# Patient Record
Sex: MALE | Race: WHITE | NOT HISPANIC OR LATINO | Employment: OTHER | URBAN - METROPOLITAN AREA
[De-identification: names, ages, dates, MRNs, and addresses within clinical notes are randomized per-mention and may not be internally consistent; named-entity substitution may affect disease eponyms.]

---

## 2017-03-09 ENCOUNTER — HOSPITAL ENCOUNTER (OUTPATIENT)
Dept: RADIOLOGY | Facility: MEDICAL CENTER | Age: 62
End: 2017-03-09
Attending: PHYSICIAN ASSISTANT
Payer: MEDICAID

## 2017-03-09 DIAGNOSIS — B18.1 HEPATITIS B, CHRONIC (HCC): ICD-10-CM

## 2017-03-09 DIAGNOSIS — K40.90 INGUINAL HERNIA WITHOUT OBSTRUCTION OR GANGRENE, RECURRENCE NOT SPECIFIED, UNSPECIFIED LATERALITY: ICD-10-CM

## 2017-03-09 DIAGNOSIS — B19.10 HEPATITIS B VIRUS INFECTION, UNSPECIFIED CHRONICITY: ICD-10-CM

## 2017-03-09 DIAGNOSIS — R18.8 OTHER ASCITES: ICD-10-CM

## 2017-03-09 PROCEDURE — 700111 HCHG RX REV CODE 636 W/ 250 OVERRIDE (IP): Performed by: RADIOLOGY

## 2017-03-09 PROCEDURE — P9047 ALBUMIN (HUMAN), 25%, 50ML: HCPCS | Performed by: RADIOLOGY

## 2017-03-09 PROCEDURE — 49083 ABD PARACENTESIS W/IMAGING: CPT

## 2017-03-09 PROCEDURE — C1729 CATH, DRAINAGE: HCPCS

## 2017-03-09 RX ORDER — ALBUMIN (HUMAN) 12.5 G/50ML
50 SOLUTION INTRAVENOUS ONCE
Status: COMPLETED | OUTPATIENT
Start: 2017-03-09 | End: 2017-03-09

## 2017-03-09 RX ADMIN — ALBUMIN (HUMAN) 50 G: 12.5 SOLUTION INTRAVENOUS at 10:45

## 2017-03-09 NOTE — PROGRESS NOTES
US guided therapeutic paracentesis done by Dr. Garcia; RLQ access site; 8000mL cloudy yellow fluid obtained and discarded; pt tolerated the procedure well; pt hemodynamically stable pre/intra/post procedure; 62.5g IV albumin was given to the patient per the Albumin Infusion Protocol, please see MAR; patient did have increased swelling at insertion site for paracentesis, patient was monitored for 15 min with no increased swelling and MD was made aware; all questions and concerns answered prior to d/c; patient provided with all appropriate education for procedure; pt d/c home.

## 2017-03-13 ENCOUNTER — HOSPITAL ENCOUNTER (OUTPATIENT)
Dept: RADIOLOGY | Facility: MEDICAL CENTER | Age: 62
End: 2017-03-13
Attending: PHYSICIAN ASSISTANT
Payer: MEDICAID

## 2017-03-13 DIAGNOSIS — B19.11 HEPATITIS B INFECTION WITHOUT DELTA AGENT WITH HEPATIC COMA, UNSPECIFIED CHRONICITY: ICD-10-CM

## 2017-03-13 DIAGNOSIS — R18.8 PERITONEAL EFFUSION (CHRONIC): ICD-10-CM

## 2017-03-13 DIAGNOSIS — K40.90 INGUINAL HERNIA OF LEFT SIDE WITHOUT OBSTRUCTION OR GANGRENE: ICD-10-CM

## 2017-03-13 DIAGNOSIS — K40.90 BUBONOCELE: ICD-10-CM

## 2017-03-13 DIAGNOSIS — B18.1 HEPATITIS B CARRIER (HCC): ICD-10-CM

## 2017-03-13 PROCEDURE — 76857 US EXAM PELVIC LIMITED: CPT

## 2017-04-07 ENCOUNTER — OFFICE VISIT (OUTPATIENT)
Dept: CARDIOLOGY | Facility: MEDICAL CENTER | Age: 62
End: 2017-04-07
Payer: MEDICAID

## 2017-04-07 VITALS
HEIGHT: 74 IN | DIASTOLIC BLOOD PRESSURE: 70 MMHG | BODY MASS INDEX: 26.56 KG/M2 | HEART RATE: 62 BPM | WEIGHT: 207 LBS | OXYGEN SATURATION: 99 % | SYSTOLIC BLOOD PRESSURE: 120 MMHG

## 2017-04-07 DIAGNOSIS — I25.10 CORONARY ARTERY DISEASE INVOLVING NATIVE CORONARY ARTERY OF NATIVE HEART WITHOUT ANGINA PECTORIS: ICD-10-CM

## 2017-04-07 PROCEDURE — 99203 OFFICE O/P NEW LOW 30 MIN: CPT | Performed by: INTERNAL MEDICINE

## 2017-04-07 RX ORDER — FUROSEMIDE 20 MG/1
20 TABLET ORAL DAILY
COMMUNITY
End: 2017-10-23

## 2017-04-07 RX ORDER — CIPROFLOXACIN 500 MG/1
500 TABLET, FILM COATED ORAL DAILY
COMMUNITY
End: 2017-10-23

## 2017-04-07 RX ORDER — SPIRONOLACTONE 50 MG/1
50 TABLET, FILM COATED ORAL DAILY
COMMUNITY
End: 2017-10-23

## 2017-04-07 ASSESSMENT — ENCOUNTER SYMPTOMS
CHILLS: 0
FEVER: 0
SHORTNESS OF BREATH: 0
DEPRESSION: 0
PND: 0
BRUISES/BLEEDS EASILY: 0
HEARTBURN: 0
HEADACHES: 0
PALPITATIONS: 0
COUGH: 0
NERVOUS/ANXIOUS: 0
EYE DISCHARGE: 0
BLURRED VISION: 0
DIZZINESS: 0
NAUSEA: 0
MYALGIAS: 0

## 2017-04-07 NOTE — MR AVS SNAPSHOT
"        Noah Valdez   2017 3:45 PM   Office Visit   MRN: [id number]    Department:  Heart [facility]   Dept Phone:  [phone]    Description:  Male : 1955   Provider:  Danis Rodriguez M.D.           Reason for Visit     New Patient           Allergies as of 2017     No Known Allergies      You were diagnosed with     Coronary artery disease involving native coronary artery of native heart without angina pectoris   [[id number]]         Vital Signs     Blood Pressure Pulse Height Weight Body Mass Index Oxygen Saturation    120/70 mmHg 62 1.88 m (6' 2.02\") 93.895 kg (207 lb) 26.57 kg/m2 99%    Smoking Status                   Former Smoker           Basic Information     Date Of Birth Sex Race Ethnicity Preferred Language    1955 Male White Non- English      Problem List              ICD-10-CM Priority Class Noted - Resolved    Lactic acidosis E87.2 Medium  2016 - Present    Hyponatremia E87.1 Medium  2016 - Present    Acute hepatitis B17.9 High  2016 - Present    Cirrhosis (CMS-HCC) K74.60 High  2016 - Present    Coagulopathy (CMS-HCC) D68.9 Medium  2016 - Present    Tobacco dependence (Chronic) F17.200 Low  2016 - Present    Acute hepatitis B B16.9   2016 - Present    Acute kidney injury (CMS-HCC) N17.9   10/1/2016 - Present    Coffee ground emesis K92.0   10/1/2016 - Present    Hepatic encephalopathy syndrome (CMS-HCC) K72.90   10/1/2016 - Present    Severe protein-calorie malnutrition (CMS-HCC) E43   10/1/2016 - Present    Hypotension I95.9   10/1/2016 - Present      Health Maintenance     Patient has no pending health maintenance at this time      Current Immunizations     No immunizations on file.      Below and/or attached are the medications your provider expects you to take. Review all of your home medications and newly ordered medications with your provider and/or pharmacist. Follow medication instructions as directed by your " provider and/or pharmacist. Please keep your medication list with you and share with your provider. Update the information when medications are discontinued, doses are changed, or new medications (including over-the-counter products) are added; and carry medication information at all times in the event of emergency situations     Allergies:  No Known Allergies          Medications  Valid as of: April 07, 2017 -  4:33 PM    Generic Name Brand Name Tablet Size Instructions for use    Ciprofloxacin HCl (Tab) CIPRO 500 MG Take 500 mg by mouth every day.        Entecavir (Tab) BARACLUDE 0.5 MG Take 1 Tab by mouth every day.        Furosemide (Tab) LASIX 20 MG Take 20 mg by mouth every day.        Lactulose (Solution) lactulose 20 GM/30ML Take 30 mL by mouth 3 times a day.        Morphine Sulfate (Solution) morphine (pf) 4 mg/ml 4 MG/ML 0.5 mL by Intravenous route every four hours as needed (Severe Pain; (Pain scale 7-10)).        Multiple Vitamin   Take  by mouth.        Multiple Vitamins-Minerals (Tab) THERAGRAN-M  Take 1 Tab by mouth every day.        Omeprazole (CAPSULE DELAYED RELEASE) PRILOSEC 20 MG Take 1 Cap by mouth 2 Times a Day.        Ondansetron (TABLET DISPERSIBLE) ZOFRAN ODT 4 MG Take 1 Tab by mouth every four hours as needed for Nausea/Vomiting (give PO if no IV route available).        Ondansetron HCl (Solution) ZOFRAN 4 MG/2ML 2 mL by Intravenous route every four hours as needed for Nausea/Vomiting (Do not exceed 16mg of ondansetron in a 24 hour period).        OxyCODONE HCl (Tab) ROXICODONE 5 MG Take 1 Tab by mouth every four hours as needed (Moderate Pain; (Pain scale 4-6)).        RifAXIMin (Tab) XIFAXAN 550 MG Take 1 Tab by mouth 2 Times a Day.        Spironolactone (Tab) ALDACTONE 50 MG Take 50 mg by mouth every day.        .                 Medicines prescribed today were sent to:     [facility] #[id number] - [place], [place] - [address]    [address] [place] [address]    Phone:  [phone] Fax: [phone]    Open 24 Hours?: No      Medication refill instructions:       If your prescription bottle indicates you have medication refills left, it is not necessary to call your provider’s office. Please contact your pharmacy and they will refill your medication.    If your prescription bottle indicates you do not have any refills left, you may request refills at any time through one of the following ways: The online [employer] system (except Urgent Care), by calling your provider’s office, or by asking your pharmacy to contact your provider’s office with a refill request. Medication refills are processed only during regular business hours and may not be available until the next business day. Your provider may request additional information or to have a follow-up visit with you prior to refilling your medication.   *Please Note: Medication refills are assigned a new Rx number when refilled electronically. Your pharmacy may indicate that no refills were authorized even though a new prescription for the same medication is available at the pharmacy. Please request the medicine by name with the pharmacy before contacting your provider for a refill.           [employer] Access Code: Activation code not generated  Current [employer] Status: Active

## 2017-04-07 NOTE — PROGRESS NOTES
Subjective:   Noah Valdez is a 61 y.o. male who presents today in consultation at the request of  and Dr. Lemus for follow-up of CABG.  The patient's extensive records from [facility] been reviewed in detail  Basically he was transferred there with liver failure in October 2016. He also developed renal failure and was on dialysis for a while.  Stress echo demonstrated normal left ventricular systolic function at rest as well as hyperdynamic left ventricular systolic function with dobutamine although he developed hypotension with dobutamine as well as some ST segment depression.  He underwent coronary artery artery demonstrating normal left ventricular systolic function. The left main coronary artery was totally occluded. There was stenosis of the proximal LAD and circumflex. There was extensive collateralization of both vessels from the right coronary artery which had only 40% narrowing.  The patient has had a remarkable recovery over the last several months and even frequent paracentesis has lessened with time as well as Lasix and spironolactone.  Energy level has improved significantly.  He has no symptoms of coronary artery disease.  He has an inguinal hernia.  Gastroenterologist locally with like to do upper endoscopy and colonoscopy but is reluctant due to the patient's coronary artery disease.  The general surgeon would rather not operate on this patient for inguinal hernia due to his extensive asymptomatic coronary artery disease.  The patient's inguinal hernias minimally symptomatic at this time.    Past Medical History   Diagnosis Date   • Renal disorder      passed on their own     Past Surgical History   Procedure Laterality Date   • Tonsillectomy       History reviewed. No pertinent family history.  History   Smoking status   • Former Smoker   • Quit date: 09/07/2016   Smokeless tobacco   • Never Used     No Known Allergies  Outpatient Encounter Prescriptions  as of 4/7/2017   Medication Sig Dispense Refill   • ciprofloxacin (CIPRO) 500 MG Tab Take 500 mg by mouth every day.     • furosemide (LASIX) 20 MG Tab Take 20 mg by mouth every day.     • spironolactone (ALDACTONE) 50 MG Tab Take 50 mg by mouth every day.     • Multiple Vitamin (MULTI VITAMIN DAILY PO) Take  by mouth.     • entecavir (BARACLUDE) 0.5 MG Tab Take 1 Tab by mouth every day. 30 Tab    • lactulose 20 GM/30ML Solution Take 30 mL by mouth 3 times a day. (Patient not taking: Reported on 4/7/2017) 30 Each    • omeprazole (PRILOSEC) 20 MG delayed-release capsule Take 1 Cap by mouth 2 Times a Day. (Patient not taking: Reported on 4/7/2017) 30 Cap 11   • ondansetron (ZOFRAN ODT) 4 MG TABLET DISPERSIBLE Take 1 Tab by mouth every four hours as needed for Nausea/Vomiting (give PO if no IV route available). (Patient not taking: Reported on 4/7/2017) 10 Tab 0   • ondansetron (ZOFRAN) 4 MG/2ML Solution injection 2 mL by Intravenous route every four hours as needed for Nausea/Vomiting (Do not exceed 16mg of ondansetron in a 24 hour period). (Patient not taking: Reported on 4/7/2017) 15 mL    • oxycodone immediate-release (ROXICODONE) 5 MG Tab Take 1 Tab by mouth every four hours as needed (Moderate Pain; (Pain scale 4-6)). (Patient not taking: Reported on 4/7/2017) 30 Tab 0   • rifaximin (XIFAXAN) 550 MG Tab tablet Take 1 Tab by mouth 2 Times a Day. (Patient not taking: Reported on 4/7/2017) 60 Tab    • Morphine Sulfate (MORPHINE, PF, 4 MG/ML) 4 MG/ML Solution 0.5 mL by Intravenous route every four hours as needed (Severe Pain; (Pain scale 7-10)). (Patient not taking: Reported on 4/7/2017)     • therapeutic multivitamin-minerals (THERAGRAN-M) Tab Take 1 Tab by mouth every day.       No facility-administered encounter medications on file as of 4/7/2017.     Review of Systems   Constitutional: Negative for fever, chills and malaise/fatigue.   Eyes: Negative for blurred vision and discharge.   Respiratory: Negative for  "cough and shortness of breath.    Cardiovascular: Negative for chest pain, palpitations, leg swelling and PND.   Gastrointestinal: Negative for heartburn and nausea.   Genitourinary: Negative for dysuria and urgency.   Musculoskeletal: Negative for myalgias.   Skin: Negative for itching and rash.   Neurological: Negative for dizziness and headaches.   Endo/Heme/Allergies: Negative for environmental allergies. Does not bruise/bleed easily.   Psychiatric/Behavioral: Negative for depression. The patient is not nervous/anxious.         Objective:   /70 mmHg  Pulse 62  Ht 1.88 m (6' 2.02\")  Wt 93.895 kg (207 lb)  BMI 26.57 kg/m2  SpO2 99%    Physical Exam   Constitutional: He is oriented to person, place, and time. He appears well-developed and well-nourished.   HENT:   Head: Normocephalic and atraumatic.   Eyes: Conjunctivae and EOM are normal. No scleral icterus.   Neck: Neck supple. No JVD present. No thyromegaly present.   Cardiovascular: Normal rate, regular rhythm and normal heart sounds.  Exam reveals no gallop and no friction rub.    No murmur heard.  Pulmonary/Chest: Effort normal and breath sounds normal. No respiratory distress. He has no wheezes. He has no rales. He exhibits no tenderness.   Abdominal: Soft. Bowel sounds are normal. He exhibits no distension and no mass. There is no tenderness.   Neurological: He is alert and oriented to person, place, and time. Coordination normal.   Skin: Skin is warm and dry. No rash noted. No pallor.   Psychiatric: He has a normal mood and affect. His behavior is normal. Judgment and thought content normal.       Assessment:     1. Coronary artery disease involving native coronary artery of native heart without angina pectoris         Medical Decision Making:  Today's Assessment / Status / Plan:   I certainly agree with indication for elective cardiac artery bypass graft surgery in this gentleman who has had a remarkable recovery from renal and liver failure in " October.     apparently the cardiothoracic surgery group at [facility] is willing to do a CABG.  I've recommended he have his CABG done there because the possibility of liver and renal dysfunction after the surgery.  I'll be glad to seem postop.  Thank you for this consultation

## 2017-04-07 NOTE — Clinical Note
[facility]   [address]   [address]  [place] [address]  Phone: [phone]  Fax: [phone]              Noah Valdez  1955    Encounter Date: 4/7/2017    Danis Rodriguez M.D.          PROGRESS NOTE:  Subjective:   Noah Valdez is a 61 y.o. male who presents today in consultation at the request of  and Dr. Lemus for follow-up of CABG.  The patient's extensive records from [facility] been reviewed in detail  Basically he was transferred there with liver failure in October 2016. He also developed renal failure and was on dialysis for a while.  Stress echo demonstrated normal left ventricular systolic function at rest as well as hyperdynamic left ventricular systolic function with dobutamine although he developed hypotension with dobutamine as well as some ST segment depression.  He underwent coronary artery artery demonstrating normal left ventricular systolic function. The left main coronary artery was totally occluded. There was stenosis of the proximal LAD and circumflex. There was extensive collateralization of both vessels from the right coronary artery which had only 40% narrowing.  The patient has had a remarkable recovery over the last several months and even frequent paracentesis has lessened with time as well as Lasix and spironolactone.  Energy level has improved significantly.  He has no symptoms of coronary artery disease.  He has an inguinal hernia.  Gastroenterologist locally with like to do upper endoscopy and colonoscopy but is reluctant due to the patient's coronary artery disease.  The general surgeon would rather not operate on this patient for inguinal hernia due to his extensive asymptomatic coronary artery disease.  The patient's inguinal hernias minimally symptomatic at this time.    Past Medical History   Diagnosis Date   • Renal disorder      passed on  their own     Past Surgical History   Procedure Laterality Date   • Tonsillectomy       History reviewed. No pertinent family history.  History   Smoking status   • Former Smoker   • Quit date: 09/07/2016   Smokeless tobacco   • Never Used     No Known Allergies  Outpatient Encounter Prescriptions as of 4/7/2017   Medication Sig Dispense Refill   • ciprofloxacin (CIPRO) 500 MG Tab Take 500 mg by mouth every day.     • furosemide (LASIX) 20 MG Tab Take 20 mg by mouth every day.     • spironolactone (ALDACTONE) 50 MG Tab Take 50 mg by mouth every day.     • Multiple Vitamin (MULTI VITAMIN DAILY PO) Take  by mouth.     • entecavir (BARACLUDE) 0.5 MG Tab Take 1 Tab by mouth every day. 30 Tab    • lactulose 20 GM/30ML Solution Take 30 mL by mouth 3 times a day. (Patient not taking: Reported on 4/7/2017) 30 Each    • omeprazole (PRILOSEC) 20 MG delayed-release capsule Take 1 Cap by mouth 2 Times a Day. (Patient not taking: Reported on 4/7/2017) 30 Cap 11   • ondansetron (ZOFRAN ODT) 4 MG TABLET DISPERSIBLE Take 1 Tab by mouth every four hours as needed for Nausea/Vomiting (give PO if no IV route available). (Patient not taking: Reported on 4/7/2017) 10 Tab 0   • ondansetron (ZOFRAN) 4 MG/2ML Solution injection 2 mL by Intravenous route every four hours as needed for Nausea/Vomiting (Do not exceed 16mg of ondansetron in a 24 hour period). (Patient not taking: Reported on 4/7/2017) 15 mL    • oxycodone immediate-release (ROXICODONE) 5 MG Tab Take 1 Tab by mouth every four hours as needed (Moderate Pain; (Pain scale 4-6)). (Patient not taking: Reported on 4/7/2017) 30 Tab 0   • rifaximin (XIFAXAN) 550 MG Tab tablet Take 1 Tab by mouth 2 Times a Day. (Patient not taking: Reported on 4/7/2017) 60 Tab    • Morphine Sulfate (MORPHINE, PF, 4 MG/ML) 4 MG/ML Solution 0.5 mL by Intravenous route every four hours as needed (Severe Pain; (Pain scale 7-10)). (Patient not taking: Reported on 4/7/2017)     • therapeutic  "multivitamin-minerals (THERAGRAN-M) Tab Take 1 Tab by mouth every day.       No facility-administered encounter medications on file as of 4/7/2017.     Review of Systems   Constitutional: Negative for fever, chills and malaise/fatigue.   Eyes: Negative for blurred vision and discharge.   Respiratory: Negative for cough and shortness of breath.    Cardiovascular: Negative for chest pain, palpitations, leg swelling and PND.   Gastrointestinal: Negative for heartburn and nausea.   Genitourinary: Negative for dysuria and urgency.   Musculoskeletal: Negative for myalgias.   Skin: Negative for itching and rash.   Neurological: Negative for dizziness and headaches.   Endo/Heme/Allergies: Negative for environmental allergies. Does not bruise/bleed easily.   Psychiatric/Behavioral: Negative for depression. The patient is not nervous/anxious.         Objective:   /70 mmHg  Pulse 62  Ht 1.88 m (6' 2.02\")  Wt 93.895 kg (207 lb)  BMI 26.57 kg/m2  SpO2 99%    Physical Exam   Constitutional: He is oriented to person, place, and time. He appears well-developed and well-nourished.   HENT:   Head: Normocephalic and atraumatic.   Eyes: Conjunctivae and EOM are normal. No scleral icterus.   Neck: Neck supple. No JVD present. No thyromegaly present.   Cardiovascular: Normal rate, regular rhythm and normal heart sounds.  Exam reveals no gallop and no friction rub.    No murmur heard.  Pulmonary/Chest: Effort normal and breath sounds normal. No respiratory distress. He has no wheezes. He has no rales. He exhibits no tenderness.   Abdominal: Soft. Bowel sounds are normal. He exhibits no distension and no mass. There is no tenderness.   Neurological: He is alert and oriented to person, place, and time. Coordination normal.   Skin: Skin is warm and dry. No rash noted. No pallor.   Psychiatric: He has a normal mood and affect. His behavior is normal. Judgment and thought content normal.       Assessment:     1. Coronary artery " disease involving native coronary artery of native heart without angina pectoris         Medical Decision Making:  Today's Assessment / Status / Plan:   I certainly agree with indication for elective cardiac artery bypass graft surgery in this gentleman who has had a remarkable recovery from renal and liver failure in October.     apparently the cardiothoracic surgery group at [facility] is willing to do a CABG.  I've recommended he have his CABG done there because the possibility of liver and renal dysfunction after the surgery.  I'll be glad to seem postop.  Thank you for this consultation      Zhao BUCKLEY M.D.  [address]  [place] [address]  VIA Facsimile: [phone]     Aroldo Hadley M.D.  [id number]  [place] [id number]  VIA Mail

## 2017-07-20 ENCOUNTER — HOSPITAL ENCOUNTER (OUTPATIENT)
Dept: RADIOLOGY | Facility: MEDICAL CENTER | Age: 62
End: 2017-07-20
Attending: THORACIC SURGERY (CARDIOTHORACIC VASCULAR SURGERY)
Payer: MEDICAID

## 2017-07-20 ENCOUNTER — HOSPITAL ENCOUNTER (OUTPATIENT)
Dept: RADIOLOGY | Facility: MEDICAL CENTER | Age: 62
End: 2017-07-20
Payer: MEDICAID

## 2017-07-20 DIAGNOSIS — I25.119 CORONARY ARTERY DISEASE WITH ANGINA PECTORIS, UNSPECIFIED VESSEL OR LESION TYPE, UNSPECIFIED WHETHER NATIVE OR TRANSPLANTED HEART (HCC): ICD-10-CM

## 2017-07-20 PROCEDURE — 93970 EXTREMITY STUDY: CPT | Mod: 26 | Performed by: SURGERY

## 2017-07-20 PROCEDURE — 93880 EXTRACRANIAL BILAT STUDY: CPT | Mod: 26 | Performed by: SURGERY

## 2017-07-20 PROCEDURE — 93880 EXTRACRANIAL BILAT STUDY: CPT

## 2017-07-20 PROCEDURE — 93970 EXTREMITY STUDY: CPT

## 2017-09-28 ENCOUNTER — OFFICE VISIT (OUTPATIENT)
Dept: CARDIOLOGY | Facility: MEDICAL CENTER | Age: 62
End: 2017-09-28
Payer: MEDICAID

## 2017-09-28 VITALS
HEIGHT: 72 IN | DIASTOLIC BLOOD PRESSURE: 70 MMHG | OXYGEN SATURATION: 96 % | WEIGHT: 234 LBS | SYSTOLIC BLOOD PRESSURE: 104 MMHG | HEART RATE: 68 BPM | BODY MASS INDEX: 31.69 KG/M2

## 2017-09-28 DIAGNOSIS — Z95.1 S/P CABG X 2: ICD-10-CM

## 2017-09-28 DIAGNOSIS — E78.00 PURE HYPERCHOLESTEROLEMIA: ICD-10-CM

## 2017-09-28 DIAGNOSIS — Z95.828 STATUS POST ASCENDING AORTIC REPLACEMENT: ICD-10-CM

## 2017-09-28 DIAGNOSIS — I34.0 NON-RHEUMATIC MITRAL REGURGITATION: ICD-10-CM

## 2017-09-28 PROCEDURE — 99214 OFFICE O/P EST MOD 30 MIN: CPT | Performed by: INTERNAL MEDICINE

## 2017-09-28 RX ORDER — ATORVASTATIN CALCIUM 40 MG/1
40 TABLET, FILM COATED ORAL
Qty: 90 TAB | Refills: 3 | Status: SHIPPED | OUTPATIENT
Start: 2017-09-28 | End: 2017-10-23 | Stop reason: SDUPTHER

## 2017-09-28 RX ORDER — ASPIRIN 325 MG
325 TABLET ORAL EVERY 6 HOURS PRN
COMMUNITY
End: 2017-10-23

## 2017-09-28 ASSESSMENT — ENCOUNTER SYMPTOMS
DIZZINESS: 0
FEVER: 0
NAUSEA: 0
HEARTBURN: 0
MYALGIAS: 0
SHORTNESS OF BREATH: 0
EYE DISCHARGE: 0
CHILLS: 0
COUGH: 0
NERVOUS/ANXIOUS: 0
BRUISES/BLEEDS EASILY: 0
BLURRED VISION: 0
HEADACHES: 0
DEPRESSION: 0
PALPITATIONS: 0
PND: 0

## 2017-09-28 NOTE — PROGRESS NOTES
Subjective:   Noah Valdez is a 61 y.o. male who presents today Having had 2 vessel CABG 2 months ago in [facility] for occluded left main coronary artery.  Surgery went extremely well. No recurrent liver or renal problems.  He took Lipitor 40 mg per day for a month and then has not taken it since then  Has recovered extremely well after surgery.  He quit tobacco a year ago.  He was evaluated for residual liver disease and apparently ultrasound demonstrated some mild fibrosis but no cirrhosis.    He states he knows he has a abdominal aortic aneurysmgreater than 4.5 cm. It is not clear when this was last checked    Past Medical History:   Diagnosis Date   • Renal disorder     passed on their own     Past Surgical History:   Procedure Laterality Date   • TONSILLECTOMY       No family history on file.  History   Smoking Status   • Former Smoker   • Quit date: 9/7/2016   Smokeless Tobacco   • Never Used     No Known Allergies  Outpatient Encounter Prescriptions as of 9/28/2017   Medication Sig Dispense Refill   • aspirin (ASA) 325 MG Tab Take 325 mg by mouth every 6 hours as needed.     • atorvastatin (LIPITOR) 40 MG Tab Take 1 Tab by mouth every bedtime. 90 Tab 3   • Multiple Vitamin (MULTI VITAMIN DAILY PO) Take  by mouth.     • entecavir (BARACLUDE) 0.5 MG Tab Take 1 Tab by mouth every day. 30 Tab    • ciprofloxacin (CIPRO) 500 MG Tab Take 500 mg by mouth every day.     • furosemide (LASIX) 20 MG Tab Take 20 mg by mouth every day.     • spironolactone (ALDACTONE) 50 MG Tab Take 50 mg by mouth every day.     • lactulose 20 GM/30ML Solution Take 30 mL by mouth 3 times a day. (Patient not taking: Reported on 4/7/2017) 30 Each    • omeprazole (PRILOSEC) 20 MG delayed-release capsule Take 1 Cap by mouth 2 Times a Day. (Patient not taking: Reported on 4/7/2017) 30 Cap 11   • ondansetron (ZOFRAN ODT) 4 MG TABLET DISPERSIBLE Take 1 Tab by mouth every four hours as needed for Nausea/Vomiting (give PO if no IV  route available). (Patient not taking: Reported on 4/7/2017) 10 Tab 0   • ondansetron (ZOFRAN) 4 MG/2ML Solution injection 2 mL by Intravenous route every four hours as needed for Nausea/Vomiting (Do not exceed 16mg of ondansetron in a 24 hour period). (Patient not taking: Reported on 4/7/2017) 15 mL    • oxycodone immediate-release (ROXICODONE) 5 MG Tab Take 1 Tab by mouth every four hours as needed (Moderate Pain; (Pain scale 4-6)). (Patient not taking: Reported on 4/7/2017) 30 Tab 0   • rifaximin (XIFAXAN) 550 MG Tab tablet Take 1 Tab by mouth 2 Times a Day. (Patient not taking: Reported on 4/7/2017) 60 Tab    • Morphine Sulfate (MORPHINE, PF, 4 MG/ML) 4 MG/ML Solution 0.5 mL by Intravenous route every four hours as needed (Severe Pain; (Pain scale 7-10)). (Patient not taking: Reported on 4/7/2017)     • therapeutic multivitamin-minerals (THERAGRAN-M) Tab Take 1 Tab by mouth every day.       No facility-administered encounter medications on file as of 9/28/2017.      Review of Systems   Constitutional: Negative for chills, fever and malaise/fatigue.   Eyes: Negative for blurred vision and discharge.   Respiratory: Negative for cough and shortness of breath.    Cardiovascular: Negative for chest pain, palpitations, leg swelling and PND.   Gastrointestinal: Negative for heartburn and nausea.   Genitourinary: Negative for dysuria and urgency.   Musculoskeletal: Negative for myalgias.   Skin: Negative for itching and rash.   Neurological: Negative for dizziness and headaches.   Endo/Heme/Allergies: Negative for environmental allergies. Does not bruise/bleed easily.   Psychiatric/Behavioral: Negative for depression. The patient is not nervous/anxious.         Objective:   /70   Pulse 68   Ht 1.829 m (6')   Wt 106.1 kg (234 lb)   SpO2 96%   BMI 31.74 kg/m²     Physical Exam   Constitutional: He is oriented to person, place, and time. He appears well-developed and well-nourished.   HENT:   Head: Normocephalic  and atraumatic.   Eyes: Conjunctivae and EOM are normal. No scleral icterus.   Neck: Neck supple. No JVD present. No thyromegaly present.   Cardiovascular: Normal rate, regular rhythm and normal heart sounds.  Exam reveals no gallop and no friction rub.    No murmur heard.  Pulmonary/Chest: Effort normal and breath sounds normal. No respiratory distress. He has no wheezes. He has no rales. He exhibits no tenderness.   Well-healed mid sternotomy scar     Abdominal: Soft. Bowel sounds are normal. He exhibits no distension and no mass. There is no tenderness.   Neurological: He is alert and oriented to person, place, and time. Coordination normal.   Skin: Skin is warm and dry. No rash noted. No pallor.   Psychiatric: He has a normal mood and affect. His behavior is normal. Judgment and thought content normal.       Assessment:     1. S/P CABG x 2     2. Pure hypercholesterolemia  CBC WITH DIFFERENTIAL    COMP METABOLIC PANEL    LIPID PANEL   3. Non-rheumatic mitral regurgitation  Echocardiogram Comp w/o Cont   4. Status post ascending aortic replacement         Medical Decision Making:  Today's Assessment / Status / Plan:   With respect to mitral regurgitation, echocardiogram  With respect to lipid management, represcribed Lipitor 40 mg per day  Lab work in 6-8 weeks.    With respect to replacement of ascending aortic aneurysm, eventual CTA of the aorta has a baseline postoperatively.  With respect to history of abdominal aortic aneurysm, eventual follow-up ultrasound.  Return in 2 months

## 2017-09-28 NOTE — LETTER
[facility]   [address]   [address]  [place] [address]  Phone: [phone]  Fax: [phone]              Noah Valdez  1955    Encounter Date: 9/28/2017    Danis Rodriguez M.D.          PROGRESS NOTE:  Subjective:   Noah Valdez is a 61 y.o. male who presents today Having had 2 vessel CABG 2 months ago in [facility] for occluded left main coronary artery.  Surgery went extremely well. No recurrent liver or renal problems.  He took Lipitor 40 mg per day for a month and then has not taken it since then  Has recovered extremely well after surgery.  He quit tobacco a year ago.  He was evaluated for residual liver disease and apparently ultrasound demonstrated some mild fibrosis but no cirrhosis.    He states he knows he has a abdominal aortic aneurysmgreater than 4.5 cm. It is not clear when this was last checked    Past Medical History:   Diagnosis Date   • Renal disorder     passed on their own     Past Surgical History:   Procedure Laterality Date   • TONSILLECTOMY       No family history on file.  History   Smoking Status   • Former Smoker   • Quit date: 9/7/2016   Smokeless Tobacco   • Never Used     No Known Allergies  Outpatient Encounter Prescriptions as of 9/28/2017   Medication Sig Dispense Refill   • aspirin (ASA) 325 MG Tab Take 325 mg by mouth every 6 hours as needed.     • atorvastatin (LIPITOR) 40 MG Tab Take 1 Tab by mouth every bedtime. 90 Tab 3   • Multiple Vitamin (MULTI VITAMIN DAILY PO) Take  by mouth.     • entecavir (BARACLUDE) 0.5 MG Tab Take 1 Tab by mouth every day. 30 Tab    • ciprofloxacin (CIPRO) 500 MG Tab Take 500 mg by mouth every day.     • furosemide (LASIX) 20 MG Tab Take 20 mg by mouth every day.     • spironolactone (ALDACTONE) 50 MG Tab Take 50 mg by mouth every day.     • lactulose 20 GM/30ML Solution Take 30 mL by mouth 3 times a day. (Patient not taking: Reported on  4/7/2017) 30 Each    • omeprazole (PRILOSEC) 20 MG delayed-release capsule Take 1 Cap by mouth 2 Times a Day. (Patient not taking: Reported on 4/7/2017) 30 Cap 11   • ondansetron (ZOFRAN ODT) 4 MG TABLET DISPERSIBLE Take 1 Tab by mouth every four hours as needed for Nausea/Vomiting (give PO if no IV route available). (Patient not taking: Reported on 4/7/2017) 10 Tab 0   • ondansetron (ZOFRAN) 4 MG/2ML Solution injection 2 mL by Intravenous route every four hours as needed for Nausea/Vomiting (Do not exceed 16mg of ondansetron in a 24 hour period). (Patient not taking: Reported on 4/7/2017) 15 mL    • oxycodone immediate-release (ROXICODONE) 5 MG Tab Take 1 Tab by mouth every four hours as needed (Moderate Pain; (Pain scale 4-6)). (Patient not taking: Reported on 4/7/2017) 30 Tab 0   • rifaximin (XIFAXAN) 550 MG Tab tablet Take 1 Tab by mouth 2 Times a Day. (Patient not taking: Reported on 4/7/2017) 60 Tab    • Morphine Sulfate (MORPHINE, PF, 4 MG/ML) 4 MG/ML Solution 0.5 mL by Intravenous route every four hours as needed (Severe Pain; (Pain scale 7-10)). (Patient not taking: Reported on 4/7/2017)     • therapeutic multivitamin-minerals (THERAGRAN-M) Tab Take 1 Tab by mouth every day.       No facility-administered encounter medications on file as of 9/28/2017.      Review of Systems   Constitutional: Negative for chills, fever and malaise/fatigue.   Eyes: Negative for blurred vision and discharge.   Respiratory: Negative for cough and shortness of breath.    Cardiovascular: Negative for chest pain, palpitations, leg swelling and PND.   Gastrointestinal: Negative for heartburn and nausea.   Genitourinary: Negative for dysuria and urgency.   Musculoskeletal: Negative for myalgias.   Skin: Negative for itching and rash.   Neurological: Negative for dizziness and headaches.   Endo/Heme/Allergies: Negative for environmental allergies. Does not bruise/bleed easily.   Psychiatric/Behavioral: Negative for depression. The  patient is not nervous/anxious.         Objective:   /70   Pulse 68   Ht 1.829 m (6')   Wt 106.1 kg (234 lb)   SpO2 96%   BMI 31.74 kg/m²      Physical Exam   Constitutional: He is oriented to person, place, and time. He appears well-developed and well-nourished.   HENT:   Head: Normocephalic and atraumatic.   Eyes: Conjunctivae and EOM are normal. No scleral icterus.   Neck: Neck supple. No JVD present. No thyromegaly present.   Cardiovascular: Normal rate, regular rhythm and normal heart sounds.  Exam reveals no gallop and no friction rub.    No murmur heard.  Pulmonary/Chest: Effort normal and breath sounds normal. No respiratory distress. He has no wheezes. He has no rales. He exhibits no tenderness.   Well-healed mid sternotomy scar     Abdominal: Soft. Bowel sounds are normal. He exhibits no distension and no mass. There is no tenderness.   Neurological: He is alert and oriented to person, place, and time. Coordination normal.   Skin: Skin is warm and dry. No rash noted. No pallor.   Psychiatric: He has a normal mood and affect. His behavior is normal. Judgment and thought content normal.       Assessment:     1. S/P CABG x 2     2. Pure hypercholesterolemia  CBC WITH DIFFERENTIAL    COMP METABOLIC PANEL    LIPID PANEL   3. Non-rheumatic mitral regurgitation  Echocardiogram Comp w/o Cont   4. Status post ascending aortic replacement         Medical Decision Making:  Today's Assessment / Status / Plan:   With respect to mitral regurgitation, echocardiogram  With respect to lipid management, represcribed Lipitor 40 mg per day  Lab work in 6-8 weeks.    With respect to replacement of ascending aortic aneurysm, eventual CTA of the aorta has a baseline postoperatively.  With respect to history of abdominal aortic aneurysm, eventual follow-up ultrasound.  Return in 2 months      Zhao BUCKLEY M.D.  [address]  [place] [address]  VIA Facsimile: [phone]

## 2017-10-04 ENCOUNTER — TELEPHONE (OUTPATIENT)
Dept: CARDIOLOGY | Facility: MEDICAL CENTER | Age: 62
End: 2017-10-04

## 2017-10-04 DIAGNOSIS — Z01.812 PRE-PROCEDURE LAB EXAM: ICD-10-CM

## 2017-10-04 NOTE — TELEPHONE ENCOUNTER
Noah Izquierdo'd call from Dr. Croft's office, his heart surgeon @ [facility]. They want him to have the post -op CT scan now. I see it mentioned in your last note, but could you place the order or let me know exactly what to order on him? I did already mail lab order for BMP to be done prior.

## 2017-10-04 NOTE — TELEPHONE ENCOUNTER
----- Message from Cristela France R.N. sent at 10/4/2017  3:39 PM PDT -----  Regarding: FW: CT scan is recommended  Contact: [phone]    L/M to call back    ----- Message -----  From: Madeline Aguilera  Sent: 10/4/2017  12:03 PM  To: Cristela France R.N.  Subject: CT scan is recommended                           RG/eloisa    Pt calling to discuss CT scan is necessary as a follow up to open heart surgery on 7/27.  Dr Croft at [facility] in [place] recommended pt have the CT scan.  Please call pt, [phone].

## 2017-10-06 DIAGNOSIS — I71.40 ABDOMINAL AORTIC ANEURYSM (AAA) WITHOUT RUPTURE (HCC): ICD-10-CM

## 2017-10-17 ENCOUNTER — TELEPHONE (OUTPATIENT)
Dept: CARDIOLOGY | Facility: MEDICAL CENTER | Age: 62
End: 2017-10-17

## 2017-10-17 NOTE — TELEPHONE ENCOUNTER
----- Message from Monika Corona L.P.N. sent at 10/17/2017  3:26 PM PDT -----      ----- Message -----  From: Danis Rodriguez M.D.  Sent: 10/13/2017   5:18 PM  To: Riana Joe R.N.    Echo normal

## 2017-10-18 ENCOUNTER — TELEPHONE (OUTPATIENT)
Dept: CARDIOLOGY | Facility: MEDICAL CENTER | Age: 62
End: 2017-10-18

## 2017-10-18 NOTE — TELEPHONE ENCOUNTER
I scheduled him to see Yue Tinsley on Monday next week.  He is very anxious to get back to work.    Yue, are you able to clear if appropriate or does he need to see Dr. Rodriguez?

## 2017-10-18 NOTE — TELEPHONE ENCOUNTER
----- Message from Nataly Joseph sent at 10/18/2017  4:00 PM PDT -----  Regarding: letter for work  Contact: [phone]  FRANCISCO/Maye Woodward is needing letter for work() stating it's okay to drive without restriction's and is able to lift 50 lbs. Pt would please like a call back to arrange pickup at [phone].

## 2017-10-19 ENCOUNTER — HOSPITAL ENCOUNTER (OUTPATIENT)
Dept: RADIOLOGY | Facility: MEDICAL CENTER | Age: 62
End: 2017-10-19
Attending: INTERNAL MEDICINE
Payer: MEDICAID

## 2017-10-19 DIAGNOSIS — Z01.812 PRE-PROCEDURE LAB EXAM: ICD-10-CM

## 2017-10-19 DIAGNOSIS — I71.40 ABDOMINAL AORTIC ANEURYSM (AAA) WITHOUT RUPTURE (HCC): ICD-10-CM

## 2017-10-19 PROCEDURE — 74175 CTA ABDOMEN W/CONTRAST: CPT

## 2017-10-19 PROCEDURE — 700117 HCHG RX CONTRAST REV CODE 255: Performed by: INTERNAL MEDICINE

## 2017-10-19 RX ADMIN — IOHEXOL 100 ML: 350 INJECTION, SOLUTION INTRAVENOUS at 10:43

## 2017-10-23 ENCOUNTER — OFFICE VISIT (OUTPATIENT)
Dept: CARDIOLOGY | Facility: MEDICAL CENTER | Age: 62
End: 2017-10-23
Payer: MEDICAID

## 2017-10-23 VITALS
HEIGHT: 73 IN | OXYGEN SATURATION: 94 % | SYSTOLIC BLOOD PRESSURE: 142 MMHG | BODY MASS INDEX: 32.1 KG/M2 | WEIGHT: 242.2 LBS | HEART RATE: 60 BPM | DIASTOLIC BLOOD PRESSURE: 88 MMHG

## 2017-10-23 DIAGNOSIS — I25.10 CORONARY ARTERY DISEASE INVOLVING NATIVE CORONARY ARTERY OF NATIVE HEART WITHOUT ANGINA PECTORIS: ICD-10-CM

## 2017-10-23 DIAGNOSIS — I71.40 ABDOMINAL AORTIC ANEURYSM (AAA) WITHOUT RUPTURE (HCC): Primary | ICD-10-CM

## 2017-10-23 DIAGNOSIS — Z95.1 HX OF CABG: ICD-10-CM

## 2017-10-23 DIAGNOSIS — E78.2 MIXED HYPERLIPIDEMIA: ICD-10-CM

## 2017-10-23 DIAGNOSIS — I34.0 NON-RHEUMATIC MITRAL REGURGITATION: ICD-10-CM

## 2017-10-23 DIAGNOSIS — I10 ESSENTIAL HYPERTENSION: ICD-10-CM

## 2017-10-23 PROBLEM — N18.30 CKD (CHRONIC KIDNEY DISEASE) STAGE 3, GFR 30-59 ML/MIN: Status: ACTIVE | Noted: 2017-10-23

## 2017-10-23 PROCEDURE — 99214 OFFICE O/P EST MOD 30 MIN: CPT | Performed by: NURSE PRACTITIONER

## 2017-10-23 RX ORDER — ATORVASTATIN CALCIUM 40 MG/1
40 TABLET, FILM COATED ORAL EVERY EVENING
Qty: 90 TAB | Refills: 3 | Status: SHIPPED | OUTPATIENT
Start: 2017-10-23 | End: 2018-10-25 | Stop reason: SDUPTHER

## 2017-10-23 RX ORDER — LISINOPRIL 5 MG/1
5 TABLET ORAL DAILY
Qty: 30 TAB | Refills: 11 | Status: SHIPPED | OUTPATIENT
Start: 2017-10-23

## 2017-10-23 RX ORDER — ASPIRIN 81 MG/1
81 TABLET, CHEWABLE ORAL DAILY
COMMUNITY

## 2017-10-23 ASSESSMENT — ENCOUNTER SYMPTOMS
DIZZINESS: 0
SHORTNESS OF BREATH: 0
ORTHOPNEA: 0
PND: 0
PALPITATIONS: 0
WEAKNESS: 0
COUGH: 0
MYALGIAS: 0
CLAUDICATION: 0
ABDOMINAL PAIN: 0

## 2017-10-23 NOTE — PROGRESS NOTES
Subjective:   Noah Valdez is a 61 y.o. male who presents today For follow-up on coronary artery disease and abdominal aortic aneurysm. He is wanting to return to work as a .    He has a history of hepatitis B and had acute liver and renal failure in October of last year. He was being evaluated for liver transplant at [facility] and underwent cardiac catheterization. He was found have left main disease therefore underwent a two-vessel bypass on July 27, 2017.    He has an abdominal aortic aneurysm which is being watched. Previously his aneurysm was 4.5 cm. Dr. Croft, cardiothoracic surgeon, ordered a CT scan and the patient is here for results.    Patient was seen on September 28, 2017 by Dr. Rodriguez who heard a murmur. Echocardiogram was ordered.    Patient once to return to work as a  but needs release from our office. He states he is feeling very well. He is riding his bike 15 miles which takes him about an hour. He also does some brisk walking and running. He tolerates all vessels without knee chest discomfort or extreme shortness of breath.      Past Medical History:   Diagnosis Date   • AAA (abdominal aortic aneurysm) (CMS-HCC) 10/19/2017    Per CT 5.3x5.3 cm.   • Hypertension    • Renal disorder     passed on their own     Past Surgical History:   Procedure Laterality Date   • MULTIPLE CORONARY ARTERY BYPASS  07/27/2017    2 vessel CABG for L main disease.  Done at [facility] in [place].   • TONSILLECTOMY       History reviewed. No pertinent family history.  History   Smoking Status   • Former Smoker   • Years: 43.00   • Quit date: 9/7/2016   Smokeless Tobacco   • Never Used     No Known Allergies  Outpatient Encounter Prescriptions as of 10/23/2017   Medication Sig Dispense Refill   • aspirin (ASA) 81 MG Chew Tab chewable tablet Take 81 mg by mouth every day.     • lisinopril (PRINIVIL) 5 MG Tab Take 1 Tab by mouth every day. 30 Tab 11   • atorvastatin (LIPITOR) 40 MG Tab  Take 1 Tab by mouth every evening. 90 Tab 3   • entecavir (BARACLUDE) 0.5 MG Tab Take 1 Tab by mouth every day. 30 Tab    • therapeutic multivitamin-minerals (THERAGRAN-M) Tab Take 1 Tab by mouth every day.     • [DISCONTINUED] aspirin (ASA) 325 MG Tab Take 325 mg by mouth every 6 hours as needed.     • [DISCONTINUED] atorvastatin (LIPITOR) 40 MG Tab Take 1 Tab by mouth every bedtime. 90 Tab 3   • [DISCONTINUED] ciprofloxacin (CIPRO) 500 MG Tab Take 500 mg by mouth every day.     • [DISCONTINUED] furosemide (LASIX) 20 MG Tab Take 20 mg by mouth every day.     • [DISCONTINUED] spironolactone (ALDACTONE) 50 MG Tab Take 50 mg by mouth every day.     • [DISCONTINUED] Multiple Vitamin (MULTI VITAMIN DAILY PO) Take  by mouth.     • [DISCONTINUED] lactulose 20 GM/30ML Solution Take 30 mL by mouth 3 times a day. (Patient not taking: Reported on 4/7/2017) 30 Each    • [DISCONTINUED] omeprazole (PRILOSEC) 20 MG delayed-release capsule Take 1 Cap by mouth 2 Times a Day. (Patient not taking: Reported on 4/7/2017) 30 Cap 11   • [DISCONTINUED] ondansetron (ZOFRAN ODT) 4 MG TABLET DISPERSIBLE Take 1 Tab by mouth every four hours as needed for Nausea/Vomiting (give PO if no IV route available). (Patient not taking: Reported on 4/7/2017) 10 Tab 0   • [DISCONTINUED] ondansetron (ZOFRAN) 4 MG/2ML Solution injection 2 mL by Intravenous route every four hours as needed for Nausea/Vomiting (Do not exceed 16mg of ondansetron in a 24 hour period). (Patient not taking: Reported on 4/7/2017) 15 mL    • [DISCONTINUED] oxycodone immediate-release (ROXICODONE) 5 MG Tab Take 1 Tab by mouth every four hours as needed (Moderate Pain; (Pain scale 4-6)). (Patient not taking: Reported on 4/7/2017) 30 Tab 0   • [DISCONTINUED] rifaximin (XIFAXAN) 550 MG Tab tablet Take 1 Tab by mouth 2 Times a Day. (Patient not taking: Reported on 4/7/2017) 60 Tab    • [DISCONTINUED] Morphine Sulfate (MORPHINE, PF, 4 MG/ML) 4 MG/ML Solution 0.5 mL by Intravenous route  "every four hours as needed (Severe Pain; (Pain scale 7-10)). (Patient not taking: Reported on 4/7/2017)       No facility-administered encounter medications on file as of 10/23/2017.      Review of Systems   Constitutional: Negative for malaise/fatigue.   Respiratory: Negative for cough and shortness of breath.    Cardiovascular: Negative for chest pain, palpitations, orthopnea, claudication, leg swelling and PND.   Gastrointestinal: Negative for abdominal pain.   Musculoskeletal: Negative for myalgias.   Neurological: Negative for dizziness and weakness.        Objective:   /88   Pulse 60   Ht 1.854 m (6' 1\")   Wt 109.9 kg (242 lb 3.2 oz)   SpO2 94%   BMI 31.95 kg/m²     Physical Exam   Constitutional: He is oriented to person, place, and time. He appears well-developed and well-nourished.   HENT:   Head: Normocephalic.   Eyes: Conjunctivae are normal.   Neck: No JVD present. No thyromegaly present.   Cardiovascular: Normal rate and regular rhythm.    Murmur heard.   Systolic murmur is present with a grade of 1/6   Pulmonary/Chest: Effort normal and breath sounds normal. He has no wheezes. He has no rales.   Abdominal: Soft. Bowel sounds are normal. He exhibits no distension. There is hepatomegaly (liver edge is 3 fingerbreadths below the right rib border. Nontender.). There is no tenderness.   Musculoskeletal: He exhibits no edema.   Neurological: He is alert and oriented to person, place, and time.   Skin: Skin is warm and dry.   Psychiatric: He has a normal mood and affect.     10/19/2017 CT angiogram of Chest and Abdomen:  HISTORY/REASON FOR EXAM: Follow-up  AAA  Moderate atherosclerotic disease of the abdominal aorta. There is infrarenal abdominal aortic aneurysm, measuring 5.3 x 5.3 cm with circumferential mural thrombus. The aneurysm extends to the bifurcation.  TECHNIQUE/EXAM DESCRIPTION:  CT angiogram of the chest and abdomen with and without reconstructions.1. Infrarenal abdominal aortic " aneurysm, measuring 5.3 cm with circumferential mural thrombus. The aneurysm measured 4.4 x 4.9 cm on prior exam.    2. Partially obstructing 7 mm calculus in the distal right ureter with mild upstream collecting system dilatation.    3. 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    Results for ROBERTH LOPES (MRN [id number]) as of 10/23/2017 08:24    Assessment:     1. Abdominal aortic aneurysm (AAA) without rupture (CMS-HCC)     2. Essential hypertension     3. Non-rheumatic mitral regurgitation     4. Coronary artery disease involving native coronary artery of native heart without angina pectoris     5. Hx of CABG x2     6. Mixed hyperlipidemia  COMP METABOLIC PANEL    LIPID PROFILE       Medical Decision Making:  Today's Assessment / Status / Plan:     Abdominal aortic aneurysm: His aneurysm has progressed and is now 5.3 cm. This CT was ordered by Dr. Pedraza who is following the patient's aneurysm. Patient will contact his office if he has not heard from them. He may need repair of the aneurysm.    Hypertension: Patient's blood pressure remains in the 140 systolic after being rechecked twice. I do not want his blood pressure greater than 1:30 due to his aortic aneurysm. I will start him on lisinopril 5 mg daily. I would like him to check blood pressure morning and evening and bring a record with his blood pressure cuff to his follow-up appointment.    Mitral regurgitation: He has a trace of mitral regurgitation and a very small murmur. This is asymptomatic.    Coronary artery disease: Status post two-vessel bypass. Patient is exercising without any anginal symptoms. He will take aspirin 81 mg with food.    Hyperlipidemia: He had discontinued the atorvastatin previously by Dr. Rodriguez had the patient restart it. He'll check lipids and metabolic panel prior to his follow-up appointment.    He will follow-up as scheduled with Dr. Rodriguez on November 28, 2017. He will  follow-up sooner or contact us using my chart if problems.    Collaborating Provider: Dr. Edvin Talbot.

## 2017-10-23 NOTE — LETTER
[facility]   [address], [address]  [place] [address]  Phone: [phone]  Fax: [phone]              Noah Valdez  1955    Encounter Date: 10/23/2017    [place]          PROGRESS NOTE:  Subjective:   Noah Valdez is a 61 y.o. male who presents today For follow-up on coronary artery disease and abdominal aortic aneurysm. He is wanting to return to work as a .    He has a history of hepatitis B and had acute liver and renal failure in October of last year. He was being evaluated for liver transplant at [facility] and underwent cardiac catheterization. He was found have left main disease therefore underwent a two-vessel bypass on July 27, 2017.    He has an abdominal aortic aneurysm which is being watched. Previously his aneurysm was 4.5 cm. Dr. Croft, cardiothoracic surgeon, ordered a CT scan and the patient is here for results.    Patient was seen on September 28, 2017 by Dr. Rodriguez who heard a murmur. Echocardiogram was ordered.    Patient once to return to work as a  but needs release from our office. He states he is feeling very well. He is riding his bike 15 miles which takes him about an hour. He also does some brisk walking and running. He tolerates all vessels without knee chest discomfort or extreme shortness of breath.      Past Medical History:   Diagnosis Date   • AAA (abdominal aortic aneurysm) (CMS-[facility]) 10/19/2017    Per CT 5.3x5.3 cm.   • Hypertension    • Renal disorder     passed on their own     Past Surgical History:   Procedure Laterality Date   • MULTIPLE CORONARY ARTERY BYPASS  07/27/2017    2 vessel CABG for L main disease.  Done at [facility] in [place].   • TONSILLECTOMY       History reviewed. No pertinent family history.  History   Smoking Status   • Former Smoker   • Years: 43.00   • Quit date: 9/7/2016   Smokeless Tobacco   • Never Used     No Known  Allergies  Outpatient Encounter Prescriptions as of 10/23/2017   Medication Sig Dispense Refill   • aspirin (ASA) 81 MG Chew Tab chewable tablet Take 81 mg by mouth every day.     • lisinopril (PRINIVIL) 5 MG Tab Take 1 Tab by mouth every day. 30 Tab 11   • atorvastatin (LIPITOR) 40 MG Tab Take 1 Tab by mouth every evening. 90 Tab 3   • entecavir (BARACLUDE) 0.5 MG Tab Take 1 Tab by mouth every day. 30 Tab    • therapeutic multivitamin-minerals (THERAGRAN-M) Tab Take 1 Tab by mouth every day.     • [DISCONTINUED] aspirin (ASA) 325 MG Tab Take 325 mg by mouth every 6 hours as needed.     • [DISCONTINUED] atorvastatin (LIPITOR) 40 MG Tab Take 1 Tab by mouth every bedtime. 90 Tab 3   • [DISCONTINUED] ciprofloxacin (CIPRO) 500 MG Tab Take 500 mg by mouth every day.     • [DISCONTINUED] furosemide (LASIX) 20 MG Tab Take 20 mg by mouth every day.     • [DISCONTINUED] spironolactone (ALDACTONE) 50 MG Tab Take 50 mg by mouth every day.     • [DISCONTINUED] Multiple Vitamin (MULTI VITAMIN DAILY PO) Take  by mouth.     • [DISCONTINUED] lactulose 20 GM/30ML Solution Take 30 mL by mouth 3 times a day. (Patient not taking: Reported on 4/7/2017) 30 Each    • [DISCONTINUED] omeprazole (PRILOSEC) 20 MG delayed-release capsule Take 1 Cap by mouth 2 Times a Day. (Patient not taking: Reported on 4/7/2017) 30 Cap 11   • [DISCONTINUED] ondansetron (ZOFRAN ODT) 4 MG TABLET DISPERSIBLE Take 1 Tab by mouth every four hours as needed for Nausea/Vomiting (give PO if no IV route available). (Patient not taking: Reported on 4/7/2017) 10 Tab 0   • [DISCONTINUED] ondansetron (ZOFRAN) 4 MG/2ML Solution injection 2 mL by Intravenous route every four hours as needed for Nausea/Vomiting (Do not exceed 16mg of ondansetron in a 24 hour period). (Patient not taking: Reported on 4/7/2017) 15 mL    • [DISCONTINUED] oxycodone immediate-release (ROXICODONE) 5 MG Tab Take 1 Tab by mouth every four hours as needed (Moderate Pain; (Pain scale 4-6)). (Patient  "not taking: Reported on 4/7/2017) 30 Tab 0   • [DISCONTINUED] rifaximin (XIFAXAN) 550 MG Tab tablet Take 1 Tab by mouth 2 Times a Day. (Patient not taking: Reported on 4/7/2017) 60 Tab    • [DISCONTINUED] Morphine Sulfate (MORPHINE, PF, 4 MG/ML) 4 MG/ML Solution 0.5 mL by Intravenous route every four hours as needed (Severe Pain; (Pain scale 7-10)). (Patient not taking: Reported on 4/7/2017)       No facility-administered encounter medications on file as of 10/23/2017.      Review of Systems   Constitutional: Negative for malaise/fatigue.   Respiratory: Negative for cough and shortness of breath.    Cardiovascular: Negative for chest pain, palpitations, orthopnea, claudication, leg swelling and PND.   Gastrointestinal: Negative for abdominal pain.   Musculoskeletal: Negative for myalgias.   Neurological: Negative for dizziness and weakness.        Objective:   /88   Pulse 60   Ht 1.854 m (6' 1\")   Wt 109.9 kg (242 lb 3.2 oz)   SpO2 94%   BMI 31.95 kg/m²      Physical Exam   Constitutional: He is oriented to person, place, and time. He appears well-developed and well-nourished.   HENT:   Head: Normocephalic.   Eyes: Conjunctivae are normal.   Neck: No JVD present. No thyromegaly present.   Cardiovascular: Normal rate and regular rhythm.    Murmur heard.   Systolic murmur is present with a grade of 1/6   Pulmonary/Chest: Effort normal and breath sounds normal. He has no wheezes. He has no rales.   Abdominal: Soft. Bowel sounds are normal. He exhibits no distension. There is hepatomegaly (liver edge is 3 fingerbreadths below the right rib border. Nontender.). There is no tenderness.   Musculoskeletal: He exhibits no edema.   Neurological: He is alert and oriented to person, place, and time.   Skin: Skin is warm and dry.   Psychiatric: He has a normal mood and affect.     10/19/2017 CT angiogram of Chest and Abdomen:  HISTORY/REASON FOR EXAM: Follow-up  AAA  Moderate atherosclerotic disease of the abdominal " aorta. There is infrarenal abdominal aortic aneurysm, measuring 5.3 x 5.3 cm with circumferential mural thrombus. The aneurysm extends to the bifurcation.  TECHNIQUE/EXAM DESCRIPTION:  CT angiogram of the chest and abdomen with and without reconstructions.1. Infrarenal abdominal aortic aneurysm, measuring 5.3 cm with circumferential mural thrombus. The aneurysm measured 4.4 x 4.9 cm on prior exam.    2. Partially obstructing 7 mm calculus in the distal right ureter with mild upstream collecting system dilatation.    3. 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    Results for ROBERTH LOPES (MRN [id number]) as of 10/23/2017 08:24    Assessment:     1. Abdominal aortic aneurysm (AAA) without rupture (CMS-HCC)     2. Essential hypertension     3. Non-rheumatic mitral regurgitation     4. Coronary artery disease involving native coronary artery of native heart without angina pectoris     5. Hx of CABG x2     6. Mixed hyperlipidemia  COMP METABOLIC PANEL    LIPID PROFILE       Medical Decision Making:  Today's Assessment / Status / Plan:     Abdominal aortic aneurysm: His aneurysm has progressed and is now 5.3 cm. This CT was ordered by Dr. Pedraza who is following the patient's aneurysm. Patient will contact his office if he has not heard from them. He may need repair of the aneurysm.    Hypertension: Patient's blood pressure remains in the 140 systolic after being rechecked twice. I do not want his blood pressure greater than 1:30 due to his aortic aneurysm. I will start him on lisinopril 5 mg daily. I would like him to check blood pressure morning and evening and bring a record with his blood pressure cuff to his follow-up appointment.    Mitral regurgitation: He has a trace of mitral regurgitation and a very small murmur. This is asymptomatic.    Coronary artery disease: Status post two-vessel bypass. Patient is exercising without any anginal symptoms. He will take  aspirin 81 mg with food.    Hyperlipidemia: He had discontinued the atorvastatin previously by Dr. Rodriguez had the patient restart it. He'll check lipids and metabolic panel prior to his follow-up appointment.    He will follow-up as scheduled with Dr. Rodriguez on November 28, 2017. He will follow-up sooner or contact us using my chart if problems.    Collaborating Provider: Dr. Edvin Talbot.        No Recipients

## 2018-10-25 DIAGNOSIS — Z79.899 HIGH RISK MEDICATION USE: ICD-10-CM

## 2018-10-25 DIAGNOSIS — I10 ESSENTIAL HYPERTENSION: ICD-10-CM

## 2018-10-25 DIAGNOSIS — I25.10 CORONARY ARTERY DISEASE INVOLVING NATIVE CORONARY ARTERY OF NATIVE HEART WITHOUT ANGINA PECTORIS: ICD-10-CM

## 2018-10-25 DIAGNOSIS — B17.9 ACUTE HEPATITIS: ICD-10-CM

## 2018-10-25 DIAGNOSIS — E78.2 MIXED HYPERLIPIDEMIA: ICD-10-CM

## 2018-10-25 DIAGNOSIS — N18.30 CKD (CHRONIC KIDNEY DISEASE) STAGE 3, GFR 30-59 ML/MIN (HCC): ICD-10-CM

## 2018-10-25 RX ORDER — ATORVASTATIN CALCIUM 40 MG/1
40 TABLET, FILM COATED ORAL EVERY EVENING
Qty: 30 TAB | Refills: 1 | Status: SHIPPED | OUTPATIENT
Start: 2018-10-25 | End: 2019-01-21 | Stop reason: SDUPTHER

## 2018-11-13 ENCOUNTER — TELEPHONE (OUTPATIENT)
Dept: CARDIOLOGY | Facility: MEDICAL CENTER | Age: 63
End: 2018-11-13

## 2019-01-25 ENCOUNTER — TELEPHONE (OUTPATIENT)
Dept: CARDIOLOGY | Facility: MEDICAL CENTER | Age: 64
End: 2019-01-25

## 2019-01-25 NOTE — TELEPHONE ENCOUNTER
----- Message from Jaky Samuel, Med Ass't sent at 1/25/2019  3:34 PM PST -----  Regarding: RE: needs appt  Patient insurance is out of network.   ----- Message -----  From: Kymberly Sharpe, Med Ass't  Sent: 1/22/2019   2:12 PM  To: [facility] Schedulers Pool  Subject: needs appt                                       Please call patient for follow up appointment. Thank you

## 2021-03-03 DIAGNOSIS — Z23 NEED FOR VACCINATION: ICD-10-CM
